# Patient Record
Sex: FEMALE | Race: OTHER | HISPANIC OR LATINO | Employment: UNEMPLOYED | URBAN - METROPOLITAN AREA
[De-identification: names, ages, dates, MRNs, and addresses within clinical notes are randomized per-mention and may not be internally consistent; named-entity substitution may affect disease eponyms.]

---

## 2023-01-13 ENCOUNTER — HOSPITAL ENCOUNTER (EMERGENCY)
Facility: HOSPITAL | Age: 43
Discharge: HOME | End: 2023-01-13
Attending: EMERGENCY MEDICINE

## 2023-01-13 VITALS
SYSTOLIC BLOOD PRESSURE: 143 MMHG | DIASTOLIC BLOOD PRESSURE: 95 MMHG | OXYGEN SATURATION: 99 % | TEMPERATURE: 99 F | RESPIRATION RATE: 14 BRPM | HEART RATE: 67 BPM

## 2023-01-13 DIAGNOSIS — B34.9 VIRAL SYNDROME: Primary | ICD-10-CM

## 2023-01-13 LAB
B-HCG UR QL: NEGATIVE
CTP QC/QA: YES
INFLUENZA A, MOLECULAR: NOT DETECTED
INFLUENZA B, MOLECULAR: NOT DETECTED
RSV AG BY MOLECULAR METHOD: NOT DETECTED
SARS-COV-2 RNA RESP QL NAA+PROBE: NOT DETECTED

## 2023-01-13 PROCEDURE — 0241U SARS-COV2 (COVID) WITH FLU/RSV BY PCR: CPT | Performed by: EMERGENCY MEDICINE

## 2023-01-13 PROCEDURE — 25000003 PHARM REV CODE 250: Performed by: EMERGENCY MEDICINE

## 2023-01-13 PROCEDURE — 81025 URINE PREGNANCY TEST: CPT | Performed by: EMERGENCY MEDICINE

## 2023-01-13 PROCEDURE — 99284 PR EMERGENCY DEPT VISIT,LEVEL IV: ICD-10-PCS | Mod: CS,,, | Performed by: EMERGENCY MEDICINE

## 2023-01-13 PROCEDURE — 99284 EMERGENCY DEPT VISIT MOD MDM: CPT | Mod: CS,,, | Performed by: EMERGENCY MEDICINE

## 2023-01-13 PROCEDURE — 99283 EMERGENCY DEPT VISIT LOW MDM: CPT

## 2023-01-13 RX ORDER — ACETAMINOPHEN 500 MG
1000 TABLET ORAL
Status: COMPLETED | OUTPATIENT
Start: 2023-01-13 | End: 2023-01-13

## 2023-01-13 RX ORDER — IBUPROFEN 600 MG/1
600 TABLET ORAL
Status: COMPLETED | OUTPATIENT
Start: 2023-01-13 | End: 2023-01-13

## 2023-01-13 RX ADMIN — IBUPROFEN 600 MG: 600 TABLET, FILM COATED ORAL at 07:01

## 2023-01-13 RX ADMIN — ACETAMINOPHEN 1000 MG: 500 TABLET ORAL at 08:01

## 2023-01-13 NOTE — DISCHARGE INSTRUCTIONS
You were seen in the emergency department for symptoms which may be due to a virus, although your COVID and influenza were negative.  Get plenty of rest and stay well hydrated.  She may take Tylenol or ibuprofen as indicated to help relieve pain and inflammation.  Return to the emergency department for headache that does not improve with medications, confusion, trouble breathing, or any other concerning symptoms.

## 2023-01-13 NOTE — ED TRIAGE NOTES
Shahla Kinney, a 43 y.o. female presents to the ED w/ complaint of headache/body aches general malaise x 3 days. 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 Pt also c/o cough and sore throat. Denies CP/SOB.     Review of patient's allergies indicates:  No Known Allergies  No past medical history on file.     LOC: The patient is awake, alert, aware of environment with an appropriate affect. Oriented x4, speaking appropriately. Primary language is Canadian though patient understands english somewhat profiecently  APPEARANCE: Pt resting comfortably, in no acute distress, pt is clean and well groomed, clothing properly fastened  SKIN:The skin is warm and dry, color consistent with ethnicity, patient has normal skin turgor and moist mucus membranes, no bruising noted   RESPIRATORY:Airway is open and patent, respirations are spontaneous, patient has a normal effort and rate, no accessory muscle use noted.  CARDIAC: Normal rate and rhythm, no peripheral edema noted  ABDOMEN: Soft, non tender, non distended.obese  NEUROLOGIC: PERRLA, facial expression is symmetrical, patient moving all extremities spontaneously, normal sensation in all extremities when touched with a finger.  Follows all commands appropriately  MUSCULOSKELETAL: Patient moving all extremities spontaneously, no obvious swelling or deformities noted.. pt complaining of back pain unchanged with activity.

## 2023-01-13 NOTE — ED PROVIDER NOTES
Encounter Date: 2023       History     Chief Complaint   Patient presents with    Headache     Pt c/o HA and back pain x 3 days. Pt also c/o cough and sore throat. Denies CP/SOB.     43F with no sig PMH presenting with flu-like symptoms for 3 days. Reports HA, muscle aches, sore throat, congestion, mild cough for three days. Denies F/C, CP, vision changes, SOB, NVD, abd pain, changes in urination. No known sick contacts or travel. Symptoms constant. Has not tried OTCs. She also wonders if she may be pregnant.     Review of patient's allergies indicates:  No Known Allergies  Past Medical History:   Diagnosis Date    Known health problems: none      Past Surgical History:   Procedure Laterality Date     SECTION       No family history on file.     Review of Systems   Constitutional:  Positive for fatigue. Negative for chills, diaphoresis and fever.   HENT:  Positive for congestion, rhinorrhea and sore throat.    Eyes:  Negative for photophobia, pain and visual disturbance.   Respiratory:  Positive for cough. Negative for chest tightness and shortness of breath.    Cardiovascular:  Negative for chest pain and palpitations.   Gastrointestinal:  Negative for abdominal pain, nausea and vomiting.   Genitourinary:  Negative for decreased urine volume, difficulty urinating, dysuria and flank pain.   Musculoskeletal:  Positive for myalgias. Negative for back pain, neck pain and neck stiffness.   Skin:  Negative for pallor and rash.   Allergic/Immunologic: Negative for immunocompromised state.   Neurological:  Positive for headaches. Negative for dizziness, weakness, light-headedness and numbness.   Hematological:  Does not bruise/bleed easily.   Psychiatric/Behavioral:  Negative for confusion.      Physical Exam     Initial Vitals [23 0639]   BP Pulse Resp Temp SpO2   (!) 170/101 85 20 97.6 °F (36.4 °C) 99 %      MAP       --         Physical Exam    Nursing note and vitals reviewed.  Constitutional: She  appears well-developed and well-nourished. She is not diaphoretic. No distress.   HENT:   Head: Normocephalic and atraumatic.   Nose: Nose normal.   Mouth/Throat: Oropharynx is clear and moist. No oropharyngeal exudate.   Eyes: Conjunctivae and EOM are normal. Pupils are equal, round, and reactive to light. Right eye exhibits no discharge. Left eye exhibits no discharge. No scleral icterus.   Neck: Neck supple. No JVD present.   Normal range of motion.  Cardiovascular:  Normal rate, regular rhythm and intact distal pulses.           No murmur heard.  Pulmonary/Chest: Breath sounds normal. No respiratory distress. She has no wheezes. She has no rhonchi. She has no rales. She exhibits no tenderness.   Abdominal: Abdomen is soft. Bowel sounds are normal. She exhibits no distension. There is no abdominal tenderness.   Musculoskeletal:         General: No edema. Normal range of motion.      Cervical back: Normal range of motion and neck supple.     Lymphadenopathy:     She has no cervical adenopathy.   Neurological: She is alert and oriented to person, place, and time. She has normal strength. No cranial nerve deficit or sensory deficit.   Skin: Skin is warm and dry. Capillary refill takes less than 2 seconds. No rash noted. No pallor.   Psychiatric: She has a normal mood and affect. Her behavior is normal. Judgment and thought content normal.       ED Course   Procedures  Labs Reviewed   HIV 1 / 2 ANTIBODY   HEPATITIS C ANTIBODY          Imaging Results    None          Medications - No data to display  Medical Decision Making:   History:   Old Medical Records: I decided to obtain old medical records.  Initial Assessment:   Pt is nontoxic appearing and NAD, no nuchal rigidity, describing viral symptoms and will do triple viral swab. She is HTN to 170/101, denies h/o HTN and may be 2/2 to discomfort, denying symptoms of end-organ damage and will provide symptom relief and re-evaluate. If continues to be sig HTN, will  consider labs.   Differential Diagnosis:   COVID, RSV, Influenza, viral syndrome, asymptomatic hypertension, pregnancy, do not suspect HTN emergency.   Clinical Tests:   Lab Tests: Ordered and Reviewed  ED Management:  Viral panel neg but likely other viral infection. BP improved spontaneously and pt reports HA resolved and other symptoms improved s/p IBU and APAP. Stable for d/c, I discussed outpatient follow up and return precautions with pt and answered all questions.             ED Course as of 01/14/23 0855   Fri Jan 13, 2023   0811 Preg Test, Ur: Negative [JR]   0845 BP(!): 143/81  Pt reports symptoms improved s/p IBU   [JR]   1031 SARS-Cov2 (COVID) with FLU/RSV by PCR [JR]      ED Course User Index  [JR] Kelsey Mulligan MD                 Clinical Impression:   Final diagnoses:  [B34.9] Viral syndrome (Primary)               Kelsey Mulligan MD  01/14/23 0900

## 2023-01-13 NOTE — Clinical Note
"Shahla"Jean Mariearik"Nirmal was seen and treated in our emergency department on 1/13/2023.  She may return to work on 01/17/2023.       If you have any questions or concerns, please don't hesitate to call.      Kelsey Mulligan MD"

## 2024-08-17 ENCOUNTER — HOSPITAL ENCOUNTER (EMERGENCY)
Facility: HOSPITAL | Age: 44
Discharge: HOME | End: 2024-08-17
Attending: EMERGENCY MEDICINE

## 2024-08-17 VITALS
WEIGHT: 170 LBS | HEIGHT: 66 IN | SYSTOLIC BLOOD PRESSURE: 148 MMHG | RESPIRATION RATE: 16 BRPM | HEART RATE: 73 BPM | TEMPERATURE: 98 F | DIASTOLIC BLOOD PRESSURE: 94 MMHG | OXYGEN SATURATION: 96 % | BODY MASS INDEX: 27.32 KG/M2

## 2024-08-17 DIAGNOSIS — M94.0 COSTOCHONDRITIS: ICD-10-CM

## 2024-08-17 DIAGNOSIS — R76.8 HEPATITIS C ANTIBODY TEST POSITIVE: ICD-10-CM

## 2024-08-17 DIAGNOSIS — R07.89 CHEST WALL PAIN: Primary | ICD-10-CM

## 2024-08-17 DIAGNOSIS — R07.9 CHEST PAIN: ICD-10-CM

## 2024-08-17 LAB
ALBUMIN SERPL BCP-MCNC: 3.6 G/DL (ref 3.5–5.2)
ALP SERPL-CCNC: 93 U/L (ref 55–135)
ALT SERPL W/O P-5'-P-CCNC: 30 U/L (ref 10–44)
ANION GAP SERPL CALC-SCNC: 8 MMOL/L (ref 8–16)
AST SERPL-CCNC: 24 U/L (ref 10–40)
B-HCG UR QL: NEGATIVE
BASOPHILS # BLD AUTO: 0.08 K/UL (ref 0–0.2)
BASOPHILS NFR BLD: 0.9 % (ref 0–1.9)
BILIRUB SERPL-MCNC: 0.3 MG/DL (ref 0.1–1)
BNP SERPL-MCNC: 19 PG/ML (ref 0–99)
BUN SERPL-MCNC: 12 MG/DL (ref 6–20)
CALCIUM SERPL-MCNC: 9.5 MG/DL (ref 8.7–10.5)
CHLORIDE SERPL-SCNC: 104 MMOL/L (ref 95–110)
CO2 SERPL-SCNC: 25 MMOL/L (ref 23–29)
CREAT SERPL-MCNC: 0.8 MG/DL (ref 0.5–1.4)
CTP QC/QA: YES
DIFFERENTIAL METHOD BLD: ABNORMAL
EOSINOPHIL # BLD AUTO: 0.2 K/UL (ref 0–0.5)
EOSINOPHIL NFR BLD: 1.7 % (ref 0–8)
ERYTHROCYTE [DISTWIDTH] IN BLOOD BY AUTOMATED COUNT: 14.2 % (ref 11.5–14.5)
EST. GFR  (NO RACE VARIABLE): >60 ML/MIN/1.73 M^2
GLUCOSE SERPL-MCNC: 87 MG/DL (ref 70–110)
HCT VFR BLD AUTO: 37.2 % (ref 37–48.5)
HCV AB SERPL QL IA: REACTIVE
HGB BLD-MCNC: 11.3 G/DL (ref 12–16)
HIV 1+2 AB+HIV1 P24 AG SERPL QL IA: NORMAL
IMM GRANULOCYTES # BLD AUTO: 0.04 K/UL (ref 0–0.04)
IMM GRANULOCYTES NFR BLD AUTO: 0.5 % (ref 0–0.5)
LYMPHOCYTES # BLD AUTO: 3 K/UL (ref 1–4.8)
LYMPHOCYTES NFR BLD: 34.8 % (ref 18–48)
MCH RBC QN AUTO: 26.4 PG (ref 27–31)
MCHC RBC AUTO-ENTMCNC: 30.4 G/DL (ref 32–36)
MCV RBC AUTO: 87 FL (ref 82–98)
MONOCYTES # BLD AUTO: 0.7 K/UL (ref 0.3–1)
MONOCYTES NFR BLD: 7.8 % (ref 4–15)
NEUTROPHILS # BLD AUTO: 4.7 K/UL (ref 1.8–7.7)
NEUTROPHILS NFR BLD: 54.3 % (ref 38–73)
NRBC BLD-RTO: 0 /100 WBC
PLATELET # BLD AUTO: 438 K/UL (ref 150–450)
PMV BLD AUTO: 9 FL (ref 9.2–12.9)
POTASSIUM SERPL-SCNC: 3.8 MMOL/L (ref 3.5–5.1)
PROT SERPL-MCNC: 7.5 G/DL (ref 6–8.4)
RBC # BLD AUTO: 4.28 M/UL (ref 4–5.4)
SODIUM SERPL-SCNC: 137 MMOL/L (ref 136–145)
TROPONIN I SERPL DL<=0.01 NG/ML-MCNC: <0.006 NG/ML (ref 0–0.03)
TROPONIN I SERPL DL<=0.01 NG/ML-MCNC: <0.006 NG/ML (ref 0–0.03)
WBC # BLD AUTO: 8.7 K/UL (ref 3.9–12.7)

## 2024-08-17 PROCEDURE — 93010 ELECTROCARDIOGRAM REPORT: CPT | Mod: ,,, | Performed by: INTERNAL MEDICINE

## 2024-08-17 PROCEDURE — 93005 ELECTROCARDIOGRAM TRACING: CPT

## 2024-08-17 PROCEDURE — 25000003 PHARM REV CODE 250: Performed by: PHYSICIAN ASSISTANT

## 2024-08-17 PROCEDURE — 87522 HEPATITIS C REVRS TRNSCRPJ: CPT | Performed by: PHYSICIAN ASSISTANT

## 2024-08-17 PROCEDURE — 80053 COMPREHEN METABOLIC PANEL: CPT | Performed by: PHYSICIAN ASSISTANT

## 2024-08-17 PROCEDURE — 86803 HEPATITIS C AB TEST: CPT | Performed by: PHYSICIAN ASSISTANT

## 2024-08-17 PROCEDURE — 83880 ASSAY OF NATRIURETIC PEPTIDE: CPT | Performed by: PHYSICIAN ASSISTANT

## 2024-08-17 PROCEDURE — 85025 COMPLETE CBC W/AUTO DIFF WBC: CPT | Performed by: PHYSICIAN ASSISTANT

## 2024-08-17 PROCEDURE — 84484 ASSAY OF TROPONIN QUANT: CPT | Performed by: PHYSICIAN ASSISTANT

## 2024-08-17 PROCEDURE — 96375 TX/PRO/DX INJ NEW DRUG ADDON: CPT

## 2024-08-17 PROCEDURE — 81025 URINE PREGNANCY TEST: CPT | Performed by: PHYSICIAN ASSISTANT

## 2024-08-17 PROCEDURE — 99285 EMERGENCY DEPT VISIT HI MDM: CPT | Mod: 25

## 2024-08-17 PROCEDURE — 96374 THER/PROPH/DIAG INJ IV PUSH: CPT

## 2024-08-17 PROCEDURE — 63600175 PHARM REV CODE 636 W HCPCS: Performed by: PHYSICIAN ASSISTANT

## 2024-08-17 PROCEDURE — 87389 HIV-1 AG W/HIV-1&-2 AB AG IA: CPT | Performed by: PHYSICIAN ASSISTANT

## 2024-08-17 RX ORDER — KETOROLAC TROMETHAMINE 30 MG/ML
10 INJECTION, SOLUTION INTRAMUSCULAR; INTRAVENOUS
Status: COMPLETED | OUTPATIENT
Start: 2024-08-17 | End: 2024-08-17

## 2024-08-17 RX ORDER — METHOCARBAMOL 750 MG/1
750 TABLET, FILM COATED ORAL 3 TIMES DAILY PRN
Qty: 15 TABLET | Refills: 0 | Status: SHIPPED | OUTPATIENT
Start: 2024-08-17 | End: 2024-08-22

## 2024-08-17 RX ORDER — ONDANSETRON HYDROCHLORIDE 2 MG/ML
4 INJECTION, SOLUTION INTRAVENOUS
Status: COMPLETED | OUTPATIENT
Start: 2024-08-17 | End: 2024-08-17

## 2024-08-17 RX ORDER — LIDOCAINE 50 MG/G
1 PATCH TOPICAL
Status: DISCONTINUED | OUTPATIENT
Start: 2024-08-17 | End: 2024-08-17 | Stop reason: HOSPADM

## 2024-08-17 RX ORDER — ACETAMINOPHEN 500 MG
1000 TABLET ORAL
Status: COMPLETED | OUTPATIENT
Start: 2024-08-17 | End: 2024-08-17

## 2024-08-17 RX ORDER — METHOCARBAMOL 500 MG/1
1000 TABLET, FILM COATED ORAL
Status: COMPLETED | OUTPATIENT
Start: 2024-08-17 | End: 2024-08-17

## 2024-08-17 RX ORDER — NAPROXEN 500 MG/1
500 TABLET ORAL 2 TIMES DAILY WITH MEALS
Qty: 10 TABLET | Refills: 0 | Status: SHIPPED | OUTPATIENT
Start: 2024-08-17

## 2024-08-17 RX ORDER — LIDOCAINE 50 MG/G
1 PATCH TOPICAL DAILY
Qty: 10 PATCH | Refills: 0 | Status: SHIPPED | OUTPATIENT
Start: 2024-08-17

## 2024-08-17 RX ADMIN — KETOROLAC TROMETHAMINE 10 MG: 30 INJECTION, SOLUTION INTRAMUSCULAR; INTRAVENOUS at 03:08

## 2024-08-17 RX ADMIN — LIDOCAINE 5% 1 PATCH: 700 PATCH TOPICAL at 03:08

## 2024-08-17 RX ADMIN — ACETAMINOPHEN 1000 MG: 500 TABLET ORAL at 03:08

## 2024-08-17 RX ADMIN — METHOCARBAMOL 1000 MG: 500 TABLET ORAL at 05:08

## 2024-08-17 RX ADMIN — ONDANSETRON 4 MG: 2 INJECTION INTRAMUSCULAR; INTRAVENOUS at 05:08

## 2024-08-17 NOTE — ED TRIAGE NOTES
Shahla Rosario, a 44 y.o. female presents to the ED w/ complaint of chest pain starting this morning radiating from midsternal to top of left chest, 10/10, denies taking taking any medication. Unrelieved by rest. Pt describes pain as sharp/stabbing. Pt denies any cardiac hx. Pt denies sob. Pain worse when bending over.    Triage note:  Chief Complaint   Patient presents with    Chest Pain     States chest pain upon waking this morning that has worsened over the day. Pt tearful/ Mid sternal 10/10. Denies cardiac hx     Review of patient's allergies indicates:  No Known Allergies  Past Medical History:   Diagnosis Date    Known health problems: none

## 2024-08-17 NOTE — ED PROVIDER NOTES
Encounter Date: 2024       History     Chief Complaint   Patient presents with    Chest Pain     States chest pain upon waking this morning that has worsened over the day. Pt tearful/ Mid sternal 10/10. Denies cardiac hx     44-year-old Portuguese-speaking only female with no past medical history reported presents to the ED with a chief complaint of chest pain.  Patient reports sharp, stabbing left-sided chest pain that began this morning.  She states that the pain began when she bent over to put some of her cleaning supplies away this morning.  Patient works for housekeeping at [employer].  She states that the pain is worse with taking a deep breath, palpation and any movement.  She rates the pain 10/10.  Denies any cardiac history, history of similar symptoms, family cardiac history, fever, cough.  She does report some swelling and pain in her left ankle, but denies calf pain, history of VTE, exogenous hormone use.  She has not attempted treatment prior to arrival.       Review of patient's allergies indicates:  No Known Allergies  Past Medical History:   Diagnosis Date    Known health problems: none      Past Surgical History:   Procedure Laterality Date     SECTION      GASTRECTOMY       No family history on file.  Social History     Tobacco Use    Smoking status: Never    Smokeless tobacco: Never   Substance Use Topics    Alcohol use: Never    Drug use: Never     Review of Systems    Physical Exam     Initial Vitals   BP Pulse Resp Temp SpO2   24 1445 24 1444 24 1444 24 1444 24 1444   (!) 183/103 97 (!) 22 97.5 °F (36.4 °C) 98 %      MAP       --                Physical Exam    Nursing note and vitals reviewed.  Constitutional: She appears well-developed and well-nourished. She is not diaphoretic.  Non-toxic appearance. She does not appear ill. No distress.   Appears uncomfortable due to pain.  Tearful.   HENT:   Head: Normocephalic and atraumatic.   Neck: Neck supple.    Cardiovascular:  Normal rate and regular rhythm.     Exam reveals no gallop and no friction rub.       No murmur heard.  Pulses:       Radial pulses are 2+ on the right side and 2+ on the left side.   Pulmonary/Chest: Effort normal and breath sounds normal. No accessory muscle usage. No tachypnea. No respiratory distress. She has no decreased breath sounds. She has no wheezes. She has no rhonchi. She has no rales.   Lung exam is limited secondary to pain associated with deep breaths and painful distress.   There is ttp of the L anterior chest in the location of patient's reported pain. Reproducible on exam.    Abdominal: She exhibits no distension.   Musculoskeletal:      Cervical back: Neck supple.      Comments: Mild swelling to the left ankle.  No tenderness.  No erythema or increased warmth.  Pedal pulse intact.  Normal range of motion.     Neurological: She is alert.   Skin: No rash noted.   Psychiatric: She has a normal mood and affect. Her behavior is normal.         ED Course   Procedures  Labs Reviewed   HEPATITIS C ANTIBODY - Abnormal       Result Value    Hepatitis C Ab Reactive (*)     Narrative:     Release to patient->Immediate   CBC W/ AUTO DIFFERENTIAL - Abnormal    WBC 8.70      RBC 4.28      Hemoglobin 11.3 (*)     Hematocrit 37.2      MCV 87      MCH 26.4 (*)     MCHC 30.4 (*)     RDW 14.2      Platelets 438      MPV 9.0 (*)     Immature Granulocytes 0.5      Gran # (ANC) 4.7      Immature Grans (Abs) 0.04      Lymph # 3.0      Mono # 0.7      Eos # 0.2      Baso # 0.08      nRBC 0      Gran % 54.3      Lymph % 34.8      Mono % 7.8      Eosinophil % 1.7      Basophil % 0.9      Differential Method Automated     HIV 1 / 2 ANTIBODY    HIV 1/2 Ag/Ab Non-reactive      Narrative:     Release to patient->Immediate   COMPREHENSIVE METABOLIC PANEL    Sodium 137      Potassium 3.8      Chloride 104      CO2 25      Glucose 87      BUN 12      Creatinine 0.8      Calcium 9.5      Total Protein 7.5       Albumin 3.6      Total Bilirubin 0.3      Alkaline Phosphatase 93      AST 24      ALT 30      eGFR >60.0      Anion Gap 8     TROPONIN I    Troponin I <0.006     B-TYPE NATRIURETIC PEPTIDE    BNP 19     TROPONIN I    Troponin I <0.006     HEPATITIS C RNA, QUANTITATIVE, PCR   POCT URINE PREGNANCY    POC Preg Test, Ur Negative       Acceptable Yes            Imaging Results              X-Ray Chest PA And Lateral (Final result)  Result time 08/17/24 17:10:50      Final result by Nacho Cuellar MD (08/17/24 17:10:50)                   Impression:      1. No acute cardiopulmonary process.      Electronically signed by: Nacho Cuellar MD  Date:    08/17/2024  Time:    17:10               Narrative:    EXAMINATION:  XR CHEST PA AND LATERAL    CLINICAL HISTORY:  Chest Pain;    TECHNIQUE:  PA and lateral views of the chest were performed.    COMPARISON:  None    FINDINGS:  The cardiomediastinal silhouette is not enlarged, magnified by technique..  There is no pleural effusion.  The trachea is midline.  The lungs are symmetrically expanded bilaterally with mildly coarse interstitial attenuation, accentuated by habitus..  No large focal consolidation seen.  There is no pneumothorax.  The osseous structures are unremarkable.                                       Medications   LIDOcaine 5 % patch 1 patch (1 patch Transdermal Patch Applied 8/17/24 1550)   ketorolac injection 9.999 mg (9.999 mg Intravenous Given 8/17/24 1551)   acetaminophen tablet 1,000 mg (1,000 mg Oral Given 8/17/24 1549)   methocarbamoL tablet 1,000 mg (1,000 mg Oral Given 8/17/24 1750)   ondansetron injection 4 mg (4 mg Intravenous Given 8/17/24 1750)     Medical Decision Making  44-year-old female presents to the ED with left-sided chest pain that began this morning.  She is hypertensive at 183/103.  No history of HTN.  She appears very uncomfortable on exam and is tearful.  Her pain is very focal and is reproducible with palpation of  the left chest.   My differential diagnosis includes but is not limited to:  Muscle strain, pleurisy, costochondritis, ACS.    Plan:  Cardiac labs, chest x-ray, analgesics    Troponins x2 is negative.  Chest x-ray showed no acute intrathoracic process.  Patient received various analgesics in the ED and ultimately did have some improvement in her symptoms.   After careful consideration of the patient's history, physical exam findings, as well as empirical and objective data obtained throughout ED workup, no immediate, emergent, or life threatening condition/etiology has been identified.  Patient had reproducible chest pain.  I feel that her symptoms are secondary to costochondritis or other MSK etiology.  No further evaluation or admission was felt to be required and the patient is stable for discharge from the ED. will discharge with lidocaine patches, naproxen, Robaxin.  Also instructed to incorporate Tylenol as needed for pain.   The patient and any additional family/caregivers present were updated with test results, overall clinical impression, and recommended further plan of care, including discharge instructions as provided and outpatient follow-up for continued evaluation and management as needed. All questions were answered. The patient expressed understanding and agreed with current plan for discharge and follow-up plan of care. Strict ED return precautions were provided, including return/worsening of current symptoms, or any other concerns.      Amount and/or Complexity of Data Reviewed  Labs: ordered.  Radiology: ordered.  ECG/medicine tests:  Decision-making details documented in ED Course.    Risk  OTC drugs.  Prescription drug management.               ED Course as of 08/17/24 2136   Sat Aug 17, 2024   1459 EKG 12-lead  T wave inversions to III, AVF. T wave flattening in lateral leads. No prior EKG for comparison.  [EH]   1501 BP(!): 183/103 [EH]      ED Course User Index  [EH] Magalys Johnston PA-C                            Clinical Impression:  Final diagnoses:  [R07.89] Chest wall pain (Primary)  [M94.0] Costochondritis  [R76.8] Hepatitis C antibody test positive          ED Disposition Condition    Discharge Stable          ED Prescriptions       Medication Sig Dispense Start Date End Date Auth. Provider    naproxen (NAPROSYN) 500 MG tablet Take 1 tablet (500 mg total) by mouth 2 (two) times daily with meals. Take with food 10 tablet 8/17/2024 -- Magalys Johnston PA-C    methocarbamoL (ROBAXIN) 750 MG Tab Take 1 tablet (750 mg total) by mouth 3 (three) times daily as needed (chest wall pain). 15 tablet 8/17/2024 8/22/2024 Magalys Johnston PA-C    LIDOcaine (LIDODERM) 5 % Place 1 patch onto the skin once daily. Remove & Discard patch within 12 hours or as directed by MD 10 patch 8/17/2024 -- Magalys Johnston PA-C          Follow-up Information       Follow up With Specialties Details Why Contact Info    [facility]   [phone] [address] [facility] [address] [id number]             Magalys Johnston PA-C  08/17/24 7181

## 2024-08-18 LAB
OHS QRS DURATION: 80 MS
OHS QTC CALCULATION: 430 MS

## 2024-08-18 NOTE — DISCHARGE INSTRUCTIONS
Joel de erickson análisis de patito mostró anticuerpos positivos para la hepatitis C. [place] puede ser positivo si ha tenido pop infección previa o ha sido vacunado contra la hepatitis C.  había pop prueba secundaria pendiente que determinará si tiene pop infección aguda.  Nos comunicaremos con usted si presenta algún resultado anormal.  Cromberg naproxeno dos veces al día con las comidas.    Cromberg el relajante muscular (metocarbamol) 3 veces al día según sea necesario para el dolor.  También puede litzy 1000 mg de Tylenol 3 veces al día según sea necesario para el dolor de pecho.   Yasmine un seguimiento con ortiz médico de atención primaria en los próximos 5 a 7 días si erickson síntomas no mejoran.  Regrese a la elio de emergencias de inmediato si desarrolla algún síntoma nuevo o que empeora significativamente, desmond dificultad para respirar, fiebre superior a 100,4, dolor que empeora, entumecimiento o debilidad en los brazos o piernas o cualquier otro síntoma preocupante.    Take the naproxen twice a day with food.    Take the muscle relaxant (methocarbamol) 3 times a day as needed for pain.  You can also take 1000 mg of Tylenol 3 times a day as needed for chest pain.   Follow-up with your primary care physician in the next 5-7 days if your symptoms are not improving.  Return to the ER immediately if you develop any new or significantly worsening symptoms such as   difficulty breathing,  fever greater than 100.4,  worsening pain, numbness or weakness in your arms or legs or any other worrisome symptoms.

## 2024-08-19 LAB
HCV RNA SERPL QL NAA+PROBE: NOT DETECTED
HCV RNA SPEC NAA+PROBE-ACNC: NOT DETECTED IU/ML

## 2025-04-14 ENCOUNTER — HOSPITAL ENCOUNTER (OUTPATIENT)
Facility: HOSPITAL | Age: 45
Discharge: HOME | End: 2025-04-14
Attending: STUDENT IN AN ORGANIZED HEALTH CARE EDUCATION/TRAINING PROGRAM | Admitting: EMERGENCY MEDICINE

## 2025-04-14 VITALS
OXYGEN SATURATION: 100 % | BODY MASS INDEX: 27.32 KG/M2 | WEIGHT: 170 LBS | HEART RATE: 86 BPM | TEMPERATURE: 98 F | RESPIRATION RATE: 20 BRPM | SYSTOLIC BLOOD PRESSURE: 136 MMHG | DIASTOLIC BLOOD PRESSURE: 84 MMHG | HEIGHT: 66 IN

## 2025-04-14 DIAGNOSIS — R07.9 CHEST PAIN: ICD-10-CM

## 2025-04-14 DIAGNOSIS — R10.13 EPIGASTRIC ABDOMINAL PAIN: Primary | ICD-10-CM

## 2025-04-14 DIAGNOSIS — R10.9 ABDOMINAL PAIN: ICD-10-CM

## 2025-04-14 LAB
ABSOLUTE EOSINOPHIL (OHS): 0.2 K/UL
ABSOLUTE MONOCYTE (OHS): 0.91 K/UL (ref 0.3–1)
ABSOLUTE NEUTROPHIL COUNT (OHS): 5.37 K/UL (ref 1.8–7.7)
ALBUMIN SERPL BCP-MCNC: 3.2 G/DL (ref 3.5–5.2)
ALP SERPL-CCNC: 97 UNIT/L (ref 40–150)
ALT SERPL W/O P-5'-P-CCNC: 25 UNIT/L (ref 10–44)
ANION GAP (OHS): 9 MMOL/L (ref 8–16)
AST SERPL-CCNC: 21 UNIT/L (ref 11–45)
B-HCG UR QL: NEGATIVE
BACTERIA #/AREA URNS AUTO: NORMAL /HPF
BASOPHILS # BLD AUTO: 0.1 K/UL
BASOPHILS NFR BLD AUTO: 1 %
BILIRUB SERPL-MCNC: 0.3 MG/DL (ref 0.1–1)
BILIRUB UR QL STRIP.AUTO: NEGATIVE
BUN SERPL-MCNC: 10 MG/DL (ref 6–30)
BUN SERPL-MCNC: 11 MG/DL (ref 6–20)
CALCIUM SERPL-MCNC: 8.9 MG/DL (ref 8.7–10.5)
CHLORIDE SERPL-SCNC: 104 MMOL/L (ref 95–110)
CHLORIDE SERPL-SCNC: 104 MMOL/L (ref 95–110)
CLARITY UR: ABNORMAL
CO2 SERPL-SCNC: 19 MMOL/L (ref 23–29)
COLOR UR AUTO: YELLOW
CREAT SERPL-MCNC: 0.6 MG/DL (ref 0.5–1.4)
CREAT SERPL-MCNC: 0.7 MG/DL (ref 0.5–1.4)
CTP QC/QA: YES
ERYTHROCYTE [DISTWIDTH] IN BLOOD BY AUTOMATED COUNT: 15.8 % (ref 11.5–14.5)
GFR SERPLBLD CREATININE-BSD FMLA CKD-EPI: >60 ML/MIN/1.73/M2
GLUCOSE SERPL-MCNC: 91 MG/DL (ref 70–110)
GLUCOSE SERPL-MCNC: 99 MG/DL (ref 70–110)
GLUCOSE UR QL STRIP: NEGATIVE
HCT VFR BLD AUTO: 37.2 % (ref 37–48.5)
HCT VFR BLD CALC: 41 %PCV (ref 36–54)
HCV AB SERPL QL IA: REACTIVE
HGB BLD-MCNC: 11.7 GM/DL (ref 12–16)
HGB UR QL STRIP: ABNORMAL
HIV 1+2 AB+HIV1 P24 AG SERPL QL IA: NORMAL
IMM GRANULOCYTES # BLD AUTO: 0.06 K/UL (ref 0–0.04)
IMM GRANULOCYTES NFR BLD AUTO: 0.6 % (ref 0–0.5)
KETONES UR QL STRIP: NEGATIVE
LEUKOCYTE ESTERASE UR QL STRIP: ABNORMAL
LIPASE SERPL-CCNC: 18 U/L (ref 4–60)
LYMPHOCYTES # BLD AUTO: 3.49 K/UL (ref 1–4.8)
MCH RBC QN AUTO: 25.1 PG (ref 27–31)
MCHC RBC AUTO-ENTMCNC: 31.5 G/DL (ref 32–36)
MCV RBC AUTO: 80 FL (ref 82–98)
MICROSCOPIC COMMENT: NORMAL
NITRITE UR QL STRIP: NEGATIVE
NUCLEATED RBC (/100WBC) (OHS): 0 /100 WBC
OHS QRS DURATION: 74 MS
OHS QTC CALCULATION: 430 MS
PH UR STRIP: 6 [PH]
PLATELET # BLD AUTO: 564 K/UL (ref 150–450)
PMV BLD AUTO: 8.4 FL (ref 9.2–12.9)
POC IONIZED CALCIUM: 1.26 MMOL/L (ref 1.06–1.42)
POC TCO2 (MEASURED): 25 MMOL/L (ref 23–29)
POTASSIUM BLD-SCNC: 4.3 MMOL/L (ref 3.5–5.1)
POTASSIUM SERPL-SCNC: 3.9 MMOL/L (ref 3.5–5.1)
PROT SERPL-MCNC: 7.7 GM/DL (ref 6–8.4)
PROT UR QL STRIP: NEGATIVE
RBC # BLD AUTO: 4.66 M/UL (ref 4–5.4)
RBC #/AREA URNS AUTO: 2 /HPF (ref 0–4)
RELATIVE EOSINOPHIL (OHS): 2 %
RELATIVE LYMPHOCYTE (OHS): 34.5 % (ref 18–48)
RELATIVE MONOCYTE (OHS): 9 % (ref 4–15)
RELATIVE NEUTROPHIL (OHS): 52.9 % (ref 38–73)
SAMPLE: NORMAL
SODIUM BLD-SCNC: 138 MMOL/L (ref 136–145)
SODIUM SERPL-SCNC: 132 MMOL/L (ref 136–145)
SP GR UR STRIP: 1.01
SQUAMOUS #/AREA URNS AUTO: 21 /HPF
TROPONIN I SERPL HS-MCNC: 4 NG/L
UROBILINOGEN UR STRIP-ACNC: NEGATIVE EU/DL
WBC # BLD AUTO: 10.13 K/UL (ref 3.9–12.7)
WBC #/AREA URNS AUTO: 2 /HPF (ref 0–5)

## 2025-04-14 PROCEDURE — G0378 HOSPITAL OBSERVATION PER HR: HCPCS

## 2025-04-14 PROCEDURE — 99285 EMERGENCY DEPT VISIT HI MDM: CPT | Mod: 25

## 2025-04-14 PROCEDURE — 63600175 PHARM REV CODE 636 W HCPCS: Performed by: PHYSICIAN ASSISTANT

## 2025-04-14 PROCEDURE — 85025 COMPLETE CBC W/AUTO DIFF WBC: CPT | Performed by: STUDENT IN AN ORGANIZED HEALTH CARE EDUCATION/TRAINING PROGRAM

## 2025-04-14 PROCEDURE — 96374 THER/PROPH/DIAG INJ IV PUSH: CPT

## 2025-04-14 PROCEDURE — 84484 ASSAY OF TROPONIN QUANT: CPT | Performed by: STUDENT IN AN ORGANIZED HEALTH CARE EDUCATION/TRAINING PROGRAM

## 2025-04-14 PROCEDURE — 63600175 PHARM REV CODE 636 W HCPCS: Performed by: EMERGENCY MEDICINE

## 2025-04-14 PROCEDURE — 87522 HEPATITIS C REVRS TRNSCRPJ: CPT | Performed by: PHYSICIAN ASSISTANT

## 2025-04-14 PROCEDURE — 81003 URINALYSIS AUTO W/O SCOPE: CPT | Performed by: STUDENT IN AN ORGANIZED HEALTH CARE EDUCATION/TRAINING PROGRAM

## 2025-04-14 PROCEDURE — 96375 TX/PRO/DX INJ NEW DRUG ADDON: CPT

## 2025-04-14 PROCEDURE — 86803 HEPATITIS C AB TEST: CPT | Performed by: PHYSICIAN ASSISTANT

## 2025-04-14 PROCEDURE — 25500020 PHARM REV CODE 255: Performed by: STUDENT IN AN ORGANIZED HEALTH CARE EDUCATION/TRAINING PROGRAM

## 2025-04-14 PROCEDURE — 96376 TX/PRO/DX INJ SAME DRUG ADON: CPT

## 2025-04-14 PROCEDURE — 80047 BASIC METABLC PNL IONIZED CA: CPT

## 2025-04-14 PROCEDURE — 25000003 PHARM REV CODE 250: Performed by: STUDENT IN AN ORGANIZED HEALTH CARE EDUCATION/TRAINING PROGRAM

## 2025-04-14 PROCEDURE — 25000003 PHARM REV CODE 250: Performed by: PHYSICIAN ASSISTANT

## 2025-04-14 PROCEDURE — 83690 ASSAY OF LIPASE: CPT | Performed by: STUDENT IN AN ORGANIZED HEALTH CARE EDUCATION/TRAINING PROGRAM

## 2025-04-14 PROCEDURE — 93010 ELECTROCARDIOGRAM REPORT: CPT | Mod: ,,, | Performed by: INTERNAL MEDICINE

## 2025-04-14 PROCEDURE — 63600175 PHARM REV CODE 636 W HCPCS: Performed by: STUDENT IN AN ORGANIZED HEALTH CARE EDUCATION/TRAINING PROGRAM

## 2025-04-14 PROCEDURE — 80053 COMPREHEN METABOLIC PANEL: CPT | Performed by: STUDENT IN AN ORGANIZED HEALTH CARE EDUCATION/TRAINING PROGRAM

## 2025-04-14 PROCEDURE — 63600175 PHARM REV CODE 636 W HCPCS

## 2025-04-14 PROCEDURE — 81025 URINE PREGNANCY TEST: CPT | Performed by: STUDENT IN AN ORGANIZED HEALTH CARE EDUCATION/TRAINING PROGRAM

## 2025-04-14 PROCEDURE — 87389 HIV-1 AG W/HIV-1&-2 AB AG IA: CPT | Performed by: PHYSICIAN ASSISTANT

## 2025-04-14 PROCEDURE — 93005 ELECTROCARDIOGRAM TRACING: CPT

## 2025-04-14 RX ORDER — ONDANSETRON HYDROCHLORIDE 2 MG/ML
4 INJECTION, SOLUTION INTRAVENOUS EVERY 6 HOURS PRN
Status: DISCONTINUED | OUTPATIENT
Start: 2025-04-14 | End: 2025-04-14 | Stop reason: HOSPADM

## 2025-04-14 RX ORDER — SODIUM CHLORIDE 0.9 % (FLUSH) 0.9 %
10 SYRINGE (ML) INJECTION
Status: DISCONTINUED | OUTPATIENT
Start: 2025-04-14 | End: 2025-04-14 | Stop reason: HOSPADM

## 2025-04-14 RX ORDER — MORPHINE SULFATE 4 MG/ML
4 INJECTION, SOLUTION INTRAMUSCULAR; INTRAVENOUS EVERY 4 HOURS PRN
Status: DISCONTINUED | OUTPATIENT
Start: 2025-04-14 | End: 2025-04-14 | Stop reason: HOSPADM

## 2025-04-14 RX ORDER — DICYCLOMINE HYDROCHLORIDE 10 MG/1
20 CAPSULE ORAL
Status: COMPLETED | OUTPATIENT
Start: 2025-04-14 | End: 2025-04-14

## 2025-04-14 RX ORDER — PANTOPRAZOLE SODIUM 20 MG/1
20 TABLET, DELAYED RELEASE ORAL DAILY
Qty: 30 TABLET | Refills: 0 | Status: SHIPPED | OUTPATIENT
Start: 2025-04-14 | End: 2026-04-14

## 2025-04-14 RX ORDER — TALC
6 POWDER (GRAM) TOPICAL NIGHTLY PRN
Status: DISCONTINUED | OUTPATIENT
Start: 2025-04-14 | End: 2025-04-14 | Stop reason: HOSPADM

## 2025-04-14 RX ORDER — MORPHINE SULFATE 4 MG/ML
4 INJECTION, SOLUTION INTRAMUSCULAR; INTRAVENOUS
Refills: 0 | Status: COMPLETED | OUTPATIENT
Start: 2025-04-14 | End: 2025-04-14

## 2025-04-14 RX ORDER — LIDOCAINE HYDROCHLORIDE 20 MG/ML
15 SOLUTION OROPHARYNGEAL ONCE
Status: COMPLETED | OUTPATIENT
Start: 2025-04-14 | End: 2025-04-14

## 2025-04-14 RX ORDER — ONDANSETRON 4 MG/1
4 TABLET, FILM COATED ORAL EVERY 6 HOURS
Qty: 12 TABLET | Refills: 0 | Status: SHIPPED | OUTPATIENT
Start: 2025-04-14

## 2025-04-14 RX ORDER — FAMOTIDINE 10 MG/ML
20 INJECTION, SOLUTION INTRAVENOUS
Status: COMPLETED | OUTPATIENT
Start: 2025-04-14 | End: 2025-04-14

## 2025-04-14 RX ORDER — ALUMINUM HYDROXIDE, MAGNESIUM HYDROXIDE, AND SIMETHICONE 1200; 120; 1200 MG/30ML; MG/30ML; MG/30ML
30 SUSPENSION ORAL ONCE
Status: COMPLETED | OUTPATIENT
Start: 2025-04-14 | End: 2025-04-14

## 2025-04-14 RX ORDER — ONDANSETRON HYDROCHLORIDE 2 MG/ML
4 INJECTION, SOLUTION INTRAVENOUS
Status: COMPLETED | OUTPATIENT
Start: 2025-04-14 | End: 2025-04-14

## 2025-04-14 RX ADMIN — MORPHINE SULFATE 4 MG: 4 INJECTION INTRAVENOUS at 10:04

## 2025-04-14 RX ADMIN — IOHEXOL 75 ML: 350 INJECTION, SOLUTION INTRAVENOUS at 07:04

## 2025-04-14 RX ADMIN — ALUMINUM HYDROXIDE, MAGNESIUM HYDROXIDE, AND SIMETHICONE 30 ML: 200; 200; 20 SUSPENSION ORAL at 05:04

## 2025-04-14 RX ADMIN — ONDANSETRON 4 MG: 2 INJECTION INTRAMUSCULAR; INTRAVENOUS at 01:04

## 2025-04-14 RX ADMIN — LIDOCAINE HYDROCHLORIDE 15 ML: 20 SOLUTION ORAL at 05:04

## 2025-04-14 RX ADMIN — FAMOTIDINE 20 MG: 10 INJECTION, SOLUTION INTRAVENOUS at 03:04

## 2025-04-14 RX ADMIN — DICYCLOMINE HYDROCHLORIDE 20 MG: 10 CAPSULE ORAL at 03:04

## 2025-04-14 RX ADMIN — MORPHINE SULFATE 4 MG: 4 INJECTION INTRAVENOUS at 05:04

## 2025-04-14 NOTE — ED NOTES
Patient comes into the emergency department by private vehicle with complaints of abdominal pain. Patient denies nausea ,vomiting,SOB,chest pain at this time.      Review of patient's allergies indicates:  No Known Allergies  Past Medical History:   Diagnosis Date    Known health problems: none

## 2025-04-14 NOTE — CARE UPDATE
04/14/2025      Shahla Rosario  [address]  [address]  [facility] [address]          [facility] Medicine Dept.  [facility] [address] [id number] [phone] after hours  [phone] fax Shahla Rosario has been hospitalized at the [facility] since 4/14/2025.  Please excuse the patient from duties.  Patient may return on 4/18/25.  No restrictions.     Please contact me if you have any questions.                  __________________________  Uma Jamil PA-C  04/14/2025

## 2025-04-14 NOTE — DISCHARGE SUMMARY
"ED Observation Unit  Discharge Summary        History of Present Illness:    "Ms. Rosario is a 45-year-old female w presenting to the emergency department with the abdominal pain.     Patient is primarily Croatian-speaking and  was used to facilitate this Interview.    Patient reports epigastric pain that started approximately 4 days ago.  She describes pain as a sharp ache.  She states that it feels like food isn't passing and she feels a lot of pressure in there she has tried to treat on her own at home with Pepto-Bismol without much relief.  She notices that her pain is seems worse when she eats.  States that usually the pain comes on/worsens within an hour of eating.  She mentions 1 episode of vomiting 2 days ago.  Denies any diarrhea.  Abdominal pain does not radiate.  She mentions that she also has some left-sided chest pain that she can not tell if is related to her abdominal pain.  Patient became tearful during interview."     Labs including lipase, troponin unremarkable in the ED. CT without acute process.  Complex cystic lesions were noted in bilateral adnexal regions.  This was followed up with a pelvic ultrasound which showed bilateral adnexal/ovarian cysts versus hydrosalpinx.  Patient did not have any tenderness in her lower abdomen.  She states that her epigastric pain has improved though it is still present.  Patient states that she has some persistent nausea, but only vomits it she tries to lie flat.  Patient was placed in observation for further symptomatic treatment.    Observation Course:    Patient was placed in EDOU for epigastric pain and n/v. CT w/o emergent process. Started on PPI and antiemetics. Patient had one episode of emesis while admitted. Tolerated her lunch afterwards with no further epigastric pain noted. Reports mild nausea but otherwise feels much improved. Symptoms likely gastritis versus GERD. GI referral placed at discharge. Protonix and zofran ordered for discharge. " Patient is medically ready for discharge. All questions answered at bedside.      Consultants:    N/a    Final Diagnosis:  Epigastric pain  GERD  Nausea/ vomiting    Discharge Condition: Stable    Disposition: Home or Self Care     Time spent on the discharge of the patient including review of hospital course with the patient. reviewing discharge medications and arranging follow-up care 35 minutes.  Patient was seen and examined on the date of discharge and determined to be suitable for discharge.    Follow Up:  No future appointments.  GI referral placed    Uma Jamil PA-C   [facility]

## 2025-04-14 NOTE — H&P
"ED Observation Unit  History and Physical      I assumed care of this patient from the Main ED at onset of observation time, 1313 on 04/14/2025.       History of Present Illness:    "Ms. Rosario is a 45-year-old female w presenting to the emergency department with the abdominal pain.     Patient is primarily Latvian-speaking and  was used to facilitate this Interview.    Patient reports epigastric pain that started approximately 4 days ago.  She describes pain as a sharp ache.  She states that it feels like food isn't passing and she feels a lot of pressure in there she has tried to treat on her own at home with Pepto-Bismol without much relief.  She notices that her pain is seems worse when she eats.  States that usually the pain comes on/worsens within an hour of eating.  She mentions 1 episode of vomiting 2 days ago.  Denies any diarrhea.  Abdominal pain does not radiate.  She mentions that she also has some left-sided chest pain that she can not tell if is related to her abdominal pain.  Patient became tearful during interview."    Labs including lipase, troponin unremarkable in the ED. CT without acute process.  Complex cystic lesions were noted in bilateral adnexal regions.  This was followed up with a pelvic ultrasound which showed bilateral adnexal/ovarian cysts versus hydrosalpinx.  Patient did not have any tenderness in her lower abdomen.  She states that her epigastric pain has improved though it is still present.  Patient states that she has some persistent nausea, but only vomits it she tries to lie flat.  Patient was placed in observation for further symptomatic treatment.    I reviewed the ED Provider Note dated 04/14/2025  prior to my evaluation of this patient.  I reviewed all labs and imaging performed in the Main ED, prior to patient being placed in Observation. Patient was placed in the ED Observation Unit for Epigastric abdominal pain.    PMHx   Past Medical History:   Diagnosis Date    " Known health problems: none       Past Surgical History:   Procedure Laterality Date     SECTION      GASTRECTOMY          Family Hx   No family history on file.     Social Hx   Social History     Socioeconomic History    Marital status: Single   Tobacco Use    Smoking status: Never    Smokeless tobacco: Never   Substance and Sexual Activity    Alcohol use: Never    Drug use: Never    Sexual activity: Not Currently     Partners: Male     Birth control/protection: None        Vital Signs   Vitals:    25 0631 25 0739 25 0936 25 1031   BP: (!) 160/101 (!) 144/90 (!) 155/94    BP Location:  Left arm     Patient Position:  Sitting     Pulse: 83 73 75    Resp: 16 16 18 16   Temp: 98.1 °F (36.7 °C) 98.1 °F (36.7 °C) 97.5 °F (36.4 °C)    TempSrc: Oral Oral Oral    SpO2: 99% 99% 100%    Weight:       Height:            Review of Systems  Review of Systems   Gastrointestinal:  Positive for abdominal pain, nausea and vomiting.       Physical Exam  Physical Exam  Vitals reviewed.   Constitutional:       General: She is not in acute distress.     Appearance: She is not diaphoretic.   HENT:      Head: Normocephalic and atraumatic.   Cardiovascular:      Rate and Rhythm: Normal rate and regular rhythm.      Heart sounds: Heart sounds not distant. No murmur heard.     No friction rub. No gallop.   Pulmonary:      Effort: Pulmonary effort is normal. No respiratory distress.      Breath sounds: Normal breath sounds. No decreased breath sounds, wheezing, rhonchi or rales.   Abdominal:      General: There is no distension.      Palpations: Abdomen is soft. There is no mass.      Tenderness: There is abdominal tenderness in the epigastric area. There is no guarding.   Musculoskeletal:      Cervical back: Neck supple.   Skin:     General: Skin is warm and dry.   Neurological:      Mental Status: She is alert.   Psychiatric:         Mood and Affect: Mood and affect normal.         Medications:   Scheduled  Meds:   dicyclomine  20 mg Oral ED 1 Time    famotidine (PF)  20 mg Intravenous ED 1 Time     Continuous Infusions:  PRN Meds:.  Current Facility-Administered Medications:     melatonin, 6 mg, Oral, Nightly PRN    sodium chloride 0.9%, 10 mL, Intravenous, PRN      Assessment/Plan:  Epigastric abdominal pain  Nausea/vomiting    - normal lipase, troponin, LFTs  - CT without acute emergent process.  Bilateral adnexal cysts were noted and confirmed on pelvic ultrasound.  Patient does not have lower abdominal pain or tenderness on exam.  - symptoms likely secondary to gastritis versus GERD.  Patient reporting vomiting with lying flat.  - PRN antiemetics, antacids, analgesics  - p.o. challenged discharge  - consider GI consult if symptoms uncontrolled  - GI referral at discharge.  Recommend starting PPI.     Case was discussed with the ED provider, resident Dr. Tian.

## 2025-04-14 NOTE — ED NOTES
Patient had an emesis episode in the hallway. Patient tearful, provided with emesis bag. Team notified.

## 2025-04-14 NOTE — ED NOTES
Assumed care of patient at this time. Pt in personal clothes and placed in stretcher. Vital signs are stable, provided pt with warm blanket and pillow. Pt denied restroom use. No other complaints from pt at this time. Care ongoing.

## 2025-04-14 NOTE — PROVIDER PROGRESS NOTES - EMERGENCY DEPT.
Encounter Date: 4/14/2025    ED Physician Progress Notes          Physician Note:   Signout Note  I received signout from the previous providers, Dr. Bradley and Dr. Whitt.      Chief complaint: Abdominal Pain     Per sign out and chart review:  Shahla Rosario is a 45 y.o. female,  presenting with complaints of progressive epigastric pain.      During ED stay patient received:  Medications   aluminum-magnesium hydroxide-simethicone 200-200-20 mg/5 mL suspension 30 mL (30 mLs Oral Given 4/14/25 0511)     And   LIDOcaine viscous HCl 2% oral solution 15 mL (15 mLs Oral Given 4/14/25 0511)   morphine injection 4 mg (4 mg Intravenous Given 4/14/25 0553)        Pt signed out to me pending: CT, US, Pain control and expected admission.      Update/ Disposition: CT with evidence for cystic ovaries and higher risk for ovarian torsion. I discussed getting US to ro torsion while continuing to treat her pain. Pt is agreeable with this plan. She does deny any recent NSAID use and report prior episode of ovarian cyst pain that was similar and required surgery at [facility] many years ago. She does report pain is worse with meals.      Patient, caregiver and/or family understands the plan and verbalized agreement. All questions answered. I discussed this case with EDOU with plan for observation for pain control. US without evidence of ovarian torsion today.      Diagnostic Impression:    Abdominal Pain     Dannielle Tian MD  [facility], PGY2

## 2025-04-14 NOTE — ED PROVIDER NOTES
Encounter Date: 2025       History     Chief Complaint   Patient presents with    Abdominal Pain     Patient reports abdominal pian severe.     HPI  Ms. Rosario is a 45-year-old female w presenting to the emergency department with the abdominal pain.    Patient is primarily Zambian-speaking and  was used to facilitate this Interview.    Patient reports epigastric pain that started approximately 4 days ago.  She describes pain as a sharp ache.  She states that it feels like food isn't passing and she feels a lot of pressure in there she has tried to treat on her own at home with Pepto-Bismol without much relief.  She notices that her pain is seems worse when she eats.  States that usually the pain comes on/worsens within an hour of eating.  She mentions 1 episode of vomiting 2 days ago.  Denies any diarrhea.  Abdominal pain does not radiate.  She mentions that she also has some left-sided chest pain that she can not tell if is related to her abdominal pain.  Patient became tearful during interview.     Review of patient's allergies indicates:  No Known Allergies  Past Medical History:   Diagnosis Date    Known health problems: none      Past Surgical History:   Procedure Laterality Date     SECTION      GASTRECTOMY       No family history on file.  Social History[1]  Review of Systems    Physical Exam     Initial Vitals [25 0412]   BP Pulse Resp Temp SpO2   (!) 176/87 88 20 98.3 °F (36.8 °C) 100 %      MAP       --         Physical Exam    Nursing note and vitals reviewed.  Constitutional: She appears well-developed and well-nourished.   HENT:   Head: Normocephalic and atraumatic.   Eyes: Conjunctivae and EOM are normal. Pupils are equal, round, and reactive to light.   Cardiovascular:  Normal rate, regular rhythm, normal heart sounds and intact distal pulses.           No murmur heard.  Pulmonary/Chest: Breath sounds normal. No respiratory distress. She has no wheezes. She has no rhonchi.  She has no rales.   Abdominal: Abdomen is soft. Bowel sounds are normal. She exhibits distension (Mildly distended). There is abdominal tenderness (Epigastric).     Neurological: She is alert and oriented to person, place, and time.   Skin: Skin is warm. Capillary refill takes less than 2 seconds.   Psychiatric:   Tearful         ED Course   Procedures  Labs Reviewed   COMPREHENSIVE METABOLIC PANEL - Abnormal       Result Value    Sodium 132 (*)     Potassium 3.9      Chloride 104      CO2 19 (*)     Glucose 91      BUN 11      Creatinine 0.6      Calcium 8.9      Protein Total 7.7      Albumin 3.2 (*)     Bilirubin Total 0.3      ALP 97      AST 21      ALT 25      Anion Gap 9      eGFR >60     URINALYSIS, REFLEX TO URINE CULTURE - Abnormal    Color, UA Yellow      Appearance, UA Hazy (*)     pH, UA 6.0      Spec Grav UA 1.010      Protein, UA Negative      Glucose, UA Negative      Ketones, UA Negative      Bilirubin, UA Negative      Blood, UA 1+ (*)     Nitrites, UA Negative      Urobilinogen, UA Negative      Leukocyte Esterase, UA Trace (*)    CBC WITH DIFFERENTIAL - Abnormal    WBC 10.13      RBC 4.66      HGB 11.7 (*)     HCT 37.2      MCV 80 (*)     MCH 25.1 (*)     MCHC 31.5 (*)     RDW 15.8 (*)     Platelet Count 564 (*)     MPV 8.4 (*)     Nucleated RBC 0      Neut % 52.9      Lymph % 34.5      Mono % 9.0      Eos % 2.0      Basophil % 1.0      Imm Grans % 0.6 (*)     Neut # 5.37      Lymph # 3.49      Mono # 0.91      Eos # 0.20      Baso # 0.10      Imm Grans # 0.06 (*)    LIPASE - Normal    Lipase Level 18     TROPONIN I HIGH SENSITIVITY - Normal    Troponin High Sensitive 4     CBC W/ AUTO DIFFERENTIAL    Narrative:     The following orders were created for panel order CBC W/ AUTO DIFFERENTIAL.  Procedure                               Abnormality         Status                     ---------                               -----------         ------                     CBC with Differential[[id number]]        Abnormal            Final result                 Please view results for these tests on the individual orders.   URINALYSIS MICROSCOPIC    RBC, UA 2      WBC, UA 2      Bacteria, UA Rare      Squamous Epithelial Cells, UA 21      Microscopic Comment       HEPATITIS C ANTIBODY   HIV 1 / 2 ANTIBODY   GREY TOP URINE HOLD   ISTAT PROCEDURE    POC Glucose 99      POC BUN 10      POC Creatinine 0.7      POC Sodium 138      POC Potassium 4.3      POC Chloride 104      POC TCO2 (MEASURED) 25      POC Ionized Calcium 1.26      POC Hematocrit 41      Sample KALEIGH     ISTAT CHEM8          Imaging Results              X-Ray Chest AP Portable (Final result)  Result time 04/14/25 05:58:24      Final result by Daren Finnegan MD (04/14/25 05:58:24)                   Impression:      No significant intrathoracic abnormality.  Allowing for minimal differences in patient positioning and a slightly poorer inspiratory depth level on the current examination, there has been no significant detrimental interval change in the appearance of the chest since 08/17/2024.      Electronically signed by: Daren Finnegan MD  Date:    04/14/2025  Time:    05:58               Narrative:    EXAMINATION:  XR CHEST AP PORTABLE    TECHNIQUE:  One view    COMPARISON:  Comparison is made to 08/17/2024.  Clinical information of epigastric pain.    FINDINGS:  Allowing for magnification of the cardiomediastinal silhouette related to projection, I do not believe that the heart is any larger than upper limit of normal in size, and the cardiomediastinal silhouette is unchanged since the examination referenced above.  Pulmonary vascularity remains normal.  Lung zones are clear, and are free of significant airspace consolidation or volume loss.  No pleural fluid.  No abnormal mediastinal widening.  No pneumothorax.                                       Medications   aluminum-magnesium hydroxide-simethicone 200-200-20 mg/5 mL suspension 30 mL (30 mLs Oral Given 4/14/25  0511)     And   LIDOcaine viscous HCl 2% oral solution 15 mL (15 mLs Oral Given 4/14/25 0511)   morphine injection 4 mg (4 mg Intravenous Given 4/14/25 0553)     Medical Decision Making  This is a 45-year-old female presenting with epigastric abdominal pain with a past 4 days with associated chest pain.  On arrival she is hemodynamically stable though hypertensive.  She is afebrile.  Exam is notable for mild abdominal distention with epigastric tenderness.  Patient is tearful on exam.  Differentials at this time include gastritis, dyspepsia, pancreatitis, ACS, acid reflux, considered cholecystitis as well however less likely given Law sign negative, also consider gastroenteritis however patient isn't having persistent vomiting and diarrhea.  Plan to obtain CBC, CMP, troponin, EKG, lipase, UA.  We will trial GI cocktail for comfort.  Patient continues to endorse pain even after the GI cocktail.  Plan to treat her pain with morphine.  Hemoglobin eleven point 7 is stable.  Chest x-ray on independent interpretation does not show any acute cardiopulmonary findings.  UA does show blood and trace leuks, patient's last period was 3/17 and is anticipating her next want to start soon.  Troponin lipase are normal.  At this time we will add CT abdomen and pelvis with contrast for further evaluation.    At shift change patient is still pending CT abdomen and pelvis.  On reassessment her abdominal pain has improved slightly with the morphine but is  to palpation.  Patient was signed out to Dr. Matias    Amount and/or Complexity of Data Reviewed  External Data Reviewed: notes.     Details: 8/17/2024 patient presented with chest pain/chest wall pain that was attributed to costochondritis  Labs: ordered. Decision-making details documented in ED Course.  Radiology: ordered. Decision-making details documented in ED Course.  ECG/medicine tests:  Decision-making details documented in ED Course.    Risk  OTC  drugs.  Prescription drug management.               ED Course as of 04/14/25 0626 Mon Apr 14, 2025   0539 ISTAT PROCEDURE  normal [AC]   0548 Hemoglobin(!): 11.7  stable [AC]   0549 X-Ray Chest AP Portable  Independent interpretation no acute cardiopulmonary finding. [AC]   0550 Patient continues to endorse [AC]   0550 Severe abdominal pain.  GI cocktail did not make any change.  Added morphine [AC]   0552 Urinalysis, Reflex to Urine Culture(!)  1+ blood and trace leuks. LMP 3/17 so is expecting menses [AC]   0553 Bacteria, UA: Rare [AC]   0554 EKG 12-lead  Independently interpreted shows normal sinus rhythm, rate 92, no STEMI, nonspecific ST changes, which appears stable compared to 08/17/2024 [BD]   0624 Sodium(!): 132 [AC]   0624 Troponin I High Sensitivity: 4 [AC]   0624 Lipase: 18 [AC]      ED Course User Index  [AC] Marie Faust MD  [BD] Satnam Whitt MD                           Clinical Impression:  Final diagnoses:  [R10.13] Epigastric abdominal pain  [R10.9] Abdominal pain  [R07.9] Chest pain                   [1]   Social History  Tobacco Use    Smoking status: Never    Smokeless tobacco: Never   Substance Use Topics    Alcohol use: Never    Drug use: Never        Marie Faust MD  Resident  04/14/25 0679

## 2025-04-14 NOTE — PROGRESS NOTES
"ED Observation Unit  Progress Note      HPI   "Ms. Rosario is a 45-year-old female w presenting to the emergency department with the abdominal pain.     Patient is primarily Ukrainian-speaking and  was used to facilitate this Interview.    Patient reports epigastric pain that started approximately 4 days ago.  She describes pain as a sharp ache.  She states that it feels like food isn't passing and she feels a lot of pressure in there she has tried to treat on her own at home with Pepto-Bismol without much relief.  She notices that her pain is seems worse when she eats.  States that usually the pain comes on/worsens within an hour of eating.  She mentions 1 episode of vomiting 2 days ago.  Denies any diarrhea.  Abdominal pain does not radiate.  She mentions that she also has some left-sided chest pain that she can not tell if is related to her abdominal pain.  Patient became tearful during interview."     Labs including lipase, troponin unremarkable in the ED. CT without acute process.  Complex cystic lesions were noted in bilateral adnexal regions.  This was followed up with a pelvic ultrasound which showed bilateral adnexal/ovarian cysts versus hydrosalpinx.  Patient did not have any tenderness in her lower abdomen.  She states that her epigastric pain has improved though it is still present.  Patient states that she has some persistent nausea, but only vomits it she tries to lie flat.  Patient was placed in observation for further symptomatic treatment.    Interval History   Patient seen at bedside.  used. Reports on episode of emesis earlier this afternoon. Has not had any further episodes since and was able to eat her lunch. Reports she still has some mild nausea but denies abdominal pain. Patient feels much improved since admission and would like to discharge home tonight with zofran.     PMHx   Past Medical History:   Diagnosis Date    Known health problems: none       Past Surgical History: "   Procedure Laterality Date     SECTION      GASTRECTOMY          Family Hx   No family history on file.     Social Hx   Social History     Socioeconomic History    Marital status: Single   Tobacco Use    Smoking status: Never    Smokeless tobacco: Never   Substance and Sexual Activity    Alcohol use: Never    Drug use: Never    Sexual activity: Not Currently     Partners: Male     Birth control/protection: None        Vital Signs   Vitals:    25 0739 25 0936 25 1031 25 1519   BP: (!) 144/90 (!) 155/94  (!) 147/76   BP Location: Left arm   Left arm   Patient Position: Sitting   Lying   Pulse: 73 75  86   Resp: 16 18 16 16   Temp: 98.1 °F (36.7 °C) 97.5 °F (36.4 °C)  97.8 °F (36.6 °C)   TempSrc: Oral Oral  Oral   SpO2: 99% 100%  98%   Weight:       Height:            Review of Systems  Review of Systems   Gastrointestinal:  Positive for abdominal pain (resolved), nausea (improved) and vomiting (resolved).       Brief Physical Exam/Reassessment   Physical Exam  Vitals reviewed.   Constitutional:       General: She is not in acute distress.     Appearance: She is not diaphoretic.   HENT:      Head: Normocephalic and atraumatic.   Cardiovascular:      Rate and Rhythm: Normal rate and regular rhythm.      Heart sounds: Heart sounds not distant. No murmur heard.     No friction rub. No gallop.   Pulmonary:      Effort: Pulmonary effort is normal. No respiratory distress.      Breath sounds: Normal breath sounds. No decreased breath sounds, wheezing, rhonchi or rales.   Abdominal:      General: There is no distension.      Palpations: Abdomen is soft. There is no mass.      Tenderness: There is no abdominal tenderness. There is no guarding.   Musculoskeletal:      Cervical back: Neck supple.   Skin:     General: Skin is warm and dry.   Neurological:      Mental Status: She is alert.   Psychiatric:         Mood and Affect: Mood and affect normal.         Labs/Imaging   Labs Reviewed    HEPATITIS C ANTIBODY - Abnormal       Result Value    Hep C Ab Interp Reactive (*)    COMPREHENSIVE METABOLIC PANEL - Abnormal    Sodium 132 (*)     Potassium 3.9      Chloride 104      CO2 19 (*)     Glucose 91      BUN 11      Creatinine 0.6      Calcium 8.9      Protein Total 7.7      Albumin 3.2 (*)     Bilirubin Total 0.3      ALP 97      AST 21      ALT 25      Anion Gap 9      eGFR >60     URINALYSIS, REFLEX TO URINE CULTURE - Abnormal    Color, UA Yellow      Appearance, UA Hazy (*)     pH, UA 6.0      Spec Grav UA 1.010      Protein, UA Negative      Glucose, UA Negative      Ketones, UA Negative      Bilirubin, UA Negative      Blood, UA 1+ (*)     Nitrites, UA Negative      Urobilinogen, UA Negative      Leukocyte Esterase, UA Trace (*)    CBC WITH DIFFERENTIAL - Abnormal    WBC 10.13      RBC 4.66      HGB 11.7 (*)     HCT 37.2      MCV 80 (*)     MCH 25.1 (*)     MCHC 31.5 (*)     RDW 15.8 (*)     Platelet Count 564 (*)     MPV 8.4 (*)     Nucleated RBC 0      Neut % 52.9      Lymph % 34.5      Mono % 9.0      Eos % 2.0      Basophil % 1.0      Imm Grans % 0.6 (*)     Neut # 5.37      Lymph # 3.49      Mono # 0.91      Eos # 0.20      Baso # 0.10      Imm Grans # 0.06 (*)    HIV 1 / 2 ANTIBODY - Normal    HIV 1/2 Ag/Ab Non-Reactive     LIPASE - Normal    Lipase Level 18     TROPONIN I HIGH SENSITIVITY - Normal    Troponin High Sensitive 4     CBC W/ AUTO DIFFERENTIAL    Narrative:     The following orders were created for panel order CBC W/ AUTO DIFFERENTIAL.  Procedure                               Abnormality         Status                     ---------                               -----------         ------                     CBC with Differential[[id number]]       Abnormal            Final result                 Please view results for these tests on the individual orders.   URINALYSIS MICROSCOPIC    RBC, UA 2      WBC, UA 2      Bacteria, UA Rare      Squamous Epithelial Cells, UA 21       Microscopic Comment       GREY TOP URINE HOLD   HEPATITIS C RNA, QUANTITATIVE, PCR   POCT URINE PREGNANCY    POC Preg Test, Ur Negative       Acceptable Yes     ISTAT PROCEDURE    POC Glucose 99      POC BUN 10      POC Creatinine 0.7      POC Sodium 138      POC Potassium 4.3      POC Chloride 104      POC TCO2 (MEASURED) 25      POC Ionized Calcium 1.26      POC Hematocrit 41      Sample KALEIGH     ISTAT CHEM8      Imaging Results              US Pelvis Comp with Transvag NON-OB (xpd) (Final result)  Result time 04/14/25 12:40:04   Procedure changed from US Pelvis Complete Non OB     Final result by Jarred Hdz MD (04/14/25 12:40:04)                   Impression:      Multiple uterine fibroids.    Right adnexal cystic lesion which may represent mildly complicated cysts versus hydrosalpinx.  Infectious process is not excluded.  No convincing evidence of torsion.    Probable left ovarian simple cyst versus hydrosalpinx.      Electronically signed by: Jarred Hdz MD  Date:    04/14/2025  Time:    12:40               Narrative:    EXAMINATION:  US PELVIS COMP WITH TRANSVAG NON-OB (XPD)    CLINICAL HISTORY:  R/O ovarian torsion;    TECHNIQUE:  Transabdominal sonography of the pelvis was performed, followed by transvaginal sonography to better evaluate the uterus and ovaries.    COMPARISON:  CT abdomen pelvis from 04/14/2025    FINDINGS:  Uterus:    Size: 9.2 x 5.5 x 6.9 cm    Masses: Multiple uterine fibroids including a 3.8 x 2.6 x 3.9 cm submucosal fibroid.  A few additional smaller intramural uterine fibroids.    Endometrium: Normal in this pre menopausal patient, measuring 12 mm.    Right ovary:    Size: 7.0 x 4.9 x 3.6 cm    Appearance: Tubular cystic area measuring 4.5 x 3.6 x 3.1 cm.    Vascular flow: Normal.    Left ovary:    Left ovary is not definitively visualized.  Tubular cystic area in the left adnexa measuring 2.6 x 2.5 x 4.4 cm, previously measuring 4.3 x 1.6 x 2.2 cm.    Free  Fluid:    None.                                       CT Abdomen Pelvis With IV Contrast NO Oral Contrast (Final result)  Result time 04/14/25 09:08:16      Final result by Trae Syed MD (04/14/25 09:08:16)                   Impression:      1. No CT findings to specifically correlate with epigastric pain.  2. Complex cystic lesions in bilateral adnexal regions with thin internal septations possibly reflecting complex cysts.  This may be further evaluated with dedicated pelvic ultrasound if clinically warranted.  Of note, no large volume of free pelvic fluid.  3. Hypoattenuating focus in the right vulvar region likely reflecting a Bartholin gland cyst.  4. Uterine fibroids.  5. Additional findings as above.    Electronically signed by resident: Jean Marie Marcial  Date:    04/14/2025  Time:    08:34    Electronically signed by: Trae Syed MD  Date:    04/14/2025  Time:    09:08               Narrative:    EXAMINATION:  CT ABDOMEN PELVIS WITH IV CONTRAST    CLINICAL HISTORY:  Epigastric pain;    TECHNIQUE:  Low dose axial images, sagittal and coronal reformations were obtained from the lung bases to the pubic symphysis following the IV administration of 75 mL of Omnipaque 350 .  Oral contrast was not given.    COMPARISON:  Pelvic ultrasound 09/10/2010    Report of pelvic ultrasound 07/16/2013 (images unavailable)    FINDINGS:  SOFT TISSUES: Remote operative change of the lower anterior midline abdominal wall.    LUNG BASES/VISUALIZED MEDIASTINUM: Unremarkable.    HEPATOBILIARY: Liver is normal size. No focal hepatic lesions. No biliary ductal dilatation. Normal gallbladder.    PANCREAS: No focal masses or ductal dilatation.    SPLEEN: Normal size.    ADRENALS: No adrenal nodules.    KIDNEYS/URETERS: Kidneys are normal size and enhance symmetrically.  No nephrolithiasis or hydronephrosis. No solid mass lesions. Ureters are unremarkable.    BLADDER/PELVIC ORGANS: 6.9 x 4.5 cm hypoattenuating lesion in the right  adnexal region with apparent thin internal septations.  Additional 3.9 x 2.3 cm hypoattenuating lesion in the left adnexal region with thin internal septations.  Pedunculated calcified fibroid rising from the left aspect of the uterine body.  Additional suspected intramural fibroid in the right aspect of the fundus.  Bladder is not well distended.  No bladder wall thickening.  2.5 cm hypoattenuating focus in the right vulvar region.  Trace free fluid likely physiologic.    PERITONEUM / RETROPERITONEUM: No ascites or free air.    LYMPH NODES: No lymphadenopathy.    VESSELS: Unremarkable.    GI TRACT: Small hiatal hernia.  No evidence of bowel obstruction or inflammation. Normal appendix.  Anastomotic staple line in the left lower quadrant related to prior bowel resection.    BONES: Mild degenerative change of the spine.  No acute fractures or suspicious osseous lesions.  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  Small fat containing umbilical hernia.                                       X-Ray Chest AP Portable (Final result)  Result time 04/14/25 05:58:24      Final result by Daren Finnegan MD (04/14/25 05:58:24)                   Impression:      No significant intrathoracic abnormality.  Allowing for minimal differences in patient positioning and a slightly poorer inspiratory depth level on the current examination, there has been no significant detrimental interval change in the appearance of the chest since 08/17/2024.      Electronically signed by: Daren Finnegan MD  Date:    04/14/2025  Time:    05:58               Narrative:    EXAMINATION:  XR CHEST AP PORTABLE    TECHNIQUE:  One view    COMPARISON:  Comparison is made to 08/17/2024.  Clinical information of epigastric pain.    FINDINGS:  Allowing for magnification of the cardiomediastinal silhouette related to projection, I do not believe that the heart is any larger than upper limit of normal in size, and the cardiomediastinal silhouette is unchanged since the  examination referenced above.  Pulmonary vascularity remains normal.  Lung zones are clear, and are free of significant airspace consolidation or volume loss.  No pleural fluid.  No abnormal mediastinal widening.  No pneumothorax.                                       I reviewed all labs, imaging, EKGs.     Plan   Epigastric abdominal pain  Nausea/vomiting     - normal lipase, troponin, LFTs  - CT without acute emergent process.  Bilateral adnexal cysts were noted and confirmed on pelvic ultrasound.  Patient does not have lower abdominal pain or tenderness on exam.  - symptoms likely secondary to gastritis versus GERD  - PRN antiemetics, antacids, analgesics  - p.o. challenged passed  - GI referral at discharge.  Recommend starting PPI.   - patient with improvement in abdominal pain, nausea and vomiting. Will discharge home tonight.    I have discussed this case with Magalys Johnston PA-C.       Uma Jamil PA-C  [facility]

## 2025-04-15 LAB — HCV RNA SERPL NAA+PROBE-LOG IU: NOT DETECTED {LOG_IU}/ML

## 2025-04-22 ENCOUNTER — RESULTS FOLLOW-UP (OUTPATIENT)
Dept: EMERGENCY MEDICINE | Facility: HOSPITAL | Age: 45
End: 2025-04-22